# Patient Record
Sex: FEMALE | Race: WHITE | NOT HISPANIC OR LATINO | Employment: FULL TIME
[De-identification: names, ages, dates, MRNs, and addresses within clinical notes are randomized per-mention and may not be internally consistent; named-entity substitution may affect disease eponyms.]

---

## 2017-06-24 ENCOUNTER — HEALTH MAINTENANCE LETTER (OUTPATIENT)
Age: 42
End: 2017-06-24

## 2017-08-11 ENCOUNTER — HOSPITAL ENCOUNTER (EMERGENCY)
Facility: CLINIC | Age: 42
Discharge: HOME | End: 2017-08-11
Attending: EMERGENCY MEDICINE | Admitting: EMERGENCY MEDICINE
Payer: COMMERCIAL

## 2017-08-11 VITALS
RESPIRATION RATE: 16 BRPM | BODY MASS INDEX: 24.91 KG/M2 | OXYGEN SATURATION: 98 % | SYSTOLIC BLOOD PRESSURE: 135 MMHG | DIASTOLIC BLOOD PRESSURE: 89 MMHG | TEMPERATURE: 99.4 F | WEIGHT: 155 LBS | HEIGHT: 66 IN

## 2017-08-11 DIAGNOSIS — K08.89 PAIN, DENTAL: ICD-10-CM

## 2017-08-11 DIAGNOSIS — G89.18 POSTOPERATIVE PAIN: ICD-10-CM

## 2017-08-11 PROCEDURE — 99283 EMERGENCY DEPT VISIT LOW MDM: CPT

## 2017-08-11 PROCEDURE — 99283 EMERGENCY DEPT VISIT LOW MDM: CPT | Mod: Z6 | Performed by: EMERGENCY MEDICINE

## 2017-08-11 RX ORDER — OXYCODONE HYDROCHLORIDE 5 MG/1
5 TABLET ORAL EVERY 6 HOURS PRN
Qty: 8 TABLET | Refills: 0 | Status: SHIPPED | OUTPATIENT
Start: 2017-08-11 | End: 2024-01-26

## 2017-08-11 ASSESSMENT — ENCOUNTER SYMPTOMS: LIGHT-HEADEDNESS: 1

## 2017-08-11 NOTE — ED AVS SNAPSHOT
[facility], Emergency Department    [address] [address]    Phone:  [phone]                                       Jyothi Corrales   MRN: [id number]    Department:  [facility], Emergency Department   Date of Visit:  8/11/2017           Patient Information     Date Of Birth          1975        Your diagnoses for this visit were:     Pain, dental        You were seen by Reuben Walker MD.        Discharge Instructions       Please make an appointment to follow up with your dentist as soon as possible.      24 Hour Appointment Hotline       To make an appointment at any [facility], call [phone] ([phone]). If you don't have a family doctor or clinic, we will help you find one. [facility] clinics are conveniently located to serve the needs of you and your family.             Review of your medicines      START taking        Dose / Directions Last dose taken    oxyCODONE 5 MG IR tablet   Commonly known as:  ROXICODONE   Dose:  5 mg   Quantity:  8 tablet        Take 1 tablet (5 mg) by mouth every 6 hours as needed for pain   Refills:  0                Prescriptions were sent or printed at these locations (1 Prescription)                   Other Prescriptions                Printed at Department/Unit printer (1 of 1)         oxyCODONE (ROXICODONE) 5 MG IR tablet                Orders Needing Specimen Collection     None      Pending Results     No orders found from 8/9/2017 to 8/12/2017.            Pending Culture Results     No orders found from 8/9/2017 to 8/12/2017.            Pending Results Instructions     If you had any lab results that were not finalized at the time of your Discharge, you can call the ED Lab Result RN at [phone]. You will be contacted by this team for any positive Lab results or changes in treatment. The nurses are available 7 days a week from 10A to 6:30P.  You can leave a message 24 hours per day and they will return your call.       "  Thank you for choosing [facility]       Thank you for choosing [facility] for your care. Our goal is always to provide you with excellent care. Hearing back from our patients is one way we can continue to improve our services. Please take a few minutes to complete the written survey that you may receive in the mail after you visit with us. Thank you!        [employer] Mobile Information     [email or web address] lets you send messages to your doctor, view your test results, renew your prescriptions, schedule appointments and more. To sign up, go to [email or web address] . Click on \"Log in\" on the left side of the screen, which will take you to the Welcome page. Then click on \"Sign up Now\" on the right side of the page.     You will be asked to enter the access code listed below, as well as some personal information. Please follow the directions to create your username and password.     Your access code is: [email or web address]  Expires: 2017  4:45 PM     Your access code will  in 90 days. If you need help or a new code, please call your [facility] clinic or [phone].        Care EveryWhere ID     This is your Care EveryWhere ID. This could be used by other organizations to access your [facility] medical records  [id number]        Equal Access to Services     MEHRDAD AMAYA : Cali higuerao Solorena, waaxda luqadaha, qaybta kaalmada adearisyada, mary young. So [facility] [phone].    ATENCIÓN: Si habla español, tiene a drummond disposición servicios gratuitos de asistencia lingüística. Llame al [phone].    We comply with applicable federal civil rights laws and [place] laws. We do not discriminate on the basis of race, color, national origin, age, disability sex, sexual orientation or gender identity.            After Visit Summary       This is your record. Keep this with you and show to your community pharmacist(s) and doctor(s) at your next visit.                  "

## 2017-08-11 NOTE — ED AVS SNAPSHOT
[facility], [place], Emergency Department    [address] [place] [address]    Phone:  [phone]                                       Jyothi Corrales   MRN: [id number]    Department:  [facility], Emergency Department   Date of Visit:  8/11/2017           After Visit Summary Signature Page     I have received my discharge instructions, and my questions have been answered. I have discussed any challenges I see with this plan with the nurse or doctor.    ..........................................................................................................................................  Patient/Patient Representative Signature      ..........................................................................................................................................  Patient Representative Print Name and Relationship to Patient    ..................................................               ................................................  Date                                            Time    ..........................................................................................................................................  Reviewed by Signature/Title    ...................................................              ..............................................  Date                                                            Time

## 2017-08-11 NOTE — ED NOTES
Alert orientated ambulatory patient presents to ER triage with c/o:    1.) Dental Pain    Hx:  Root canal Tuesday.  Patient states pain is now in jaw and feels lightheaded.  Patient contacted Dentist and he recommended tylenol/ibu.  Patient has been following that plan, however pain and slight temp has cause her concern for infection.  Temp 99.4 upon arrival today - last dose 13:30 800 mg IBU, naproxen @ 10:30      Airway WDLs  Breathing WDLs  Circulation WDLs

## 2017-08-11 NOTE — ED PROVIDER NOTES
"  History     Chief Complaint   Patient presents with     Dental Pain     HPI  Jyothi Corrales is a 42 year old female who presents with dental pain. The patient reports that she had a root canal on Tuesday, 3 days ago, and since then she has continued to have jaw pain and fever. She notes that she has been taking Tylenol, ibuprofen and naproxen for her pain, however, she continues to have pain. She also developed lightheadedness today, to the point that she was unable to drive, prompting her arrival to the ED. She initially took ibuprofen and Tylenol at 8:30 AM today and naproxen at 10:30 AM. Her last dose of medication was ibuprofen 800 mg at 1:30 PM today. She complains of right-sided throbbing jaw pain and lightheadedness.     No past medical history on file.    No past surgical history on file.    No family history on file.    Social History   Substance Use Topics     Smoking status: Not on file     Smokeless tobacco: Not on file     Alcohol use Not on file     No current facility-administered medications for this encounter.      Current Outpatient Prescriptions   Medication     oxyCODONE (ROXICODONE) 5 MG IR tablet      No Known Allergies     I have reviewed the Medications, Allergies, Past Medical and Surgical History, and Social History in the Epic system.    Review of Systems   HENT: Positive for dental problem (right-sided).    Neurological: Positive for light-headedness.   All other systems reviewed and are negative.      Physical Exam   BP: 135/89  Heart Rate: 86  Temp: 99.4  F (37.4  C)  Resp: 16  Height: 166.4 cm (5' 5.5\")  Weight: 70.3 kg (155 lb)  SpO2: 98 %  Physical Exam   Constitutional: She is oriented to person, place, and time. She appears well-developed and well-nourished. No distress.   HENT:   Head: Normocephalic and atraumatic.   No obvious signs of dental abscess or dental caries   Eyes: No scleral icterus.   Neck: Normal range of motion. Neck supple.   Cardiovascular: Normal rate.  "   Pulmonary/Chest: Effort normal.   Neurological: She is alert and oriented to person, place, and time.   Skin: Skin is warm and dry. No rash noted. She is not diaphoretic. No erythema. No pallor.       ED Course     ED Course     Procedures     4:16 PM  The patient was seen and examined by Dr. Walker in Room [facility].               Critical Care time:  none               Labs Ordered and Resulted from Time of ED Arrival Up to the Time of Departure from the ED - No data to display         Assessments & Plan (with Medical Decision Making)   This is a 42-year-old female patient coming in with complaints of dental pain after a root canal that she had a couple days ago.  She is concerned because her child continues to hurt.  She contacted her dentist who prescribed antibiotics.  At this time I do not see any signs of infection.  I will provide her with a short course of Roxicodone and she can follow up with her dentist for further care and management.    I have reviewed the nursing notes.    I have reviewed the findings, diagnosis, plan and need for follow up with the patient.    Discharge Medication List as of 8/11/2017  4:45 PM      START taking these medications    Details   oxyCODONE (ROXICODONE) 5 MG IR tablet Take 1 tablet (5 mg) by mouth every 6 hours as needed for pain, Disp-8 tablet, R-0, Local Print             Final diagnoses:   Pain, dental     ISusan, am serving as a trained medical scribe to document services personally performed by Reuben Walker MD, based on the provider's statements to me.   Reuben FRANKS MD, was physically present and have reviewed and verified the accuracy of this note documented by Susan Baltazar.   8/11/2017   [facility], [place], EMERGENCY DEPARTMENT     Reuben Walker MD  08/12/17 8691

## 2024-01-14 ENCOUNTER — HEALTH MAINTENANCE LETTER (OUTPATIENT)
Age: 49
End: 2024-01-14

## 2024-01-26 ENCOUNTER — OFFICE VISIT (OUTPATIENT)
Dept: OBGYN | Facility: CLINIC | Age: 49
End: 2024-01-26
Payer: COMMERCIAL

## 2024-01-26 VITALS
WEIGHT: 183 LBS | BODY MASS INDEX: 29.99 KG/M2 | SYSTOLIC BLOOD PRESSURE: 130 MMHG | RESPIRATION RATE: 16 BRPM | DIASTOLIC BLOOD PRESSURE: 78 MMHG | TEMPERATURE: 97.8 F | HEART RATE: 90 BPM

## 2024-01-26 DIAGNOSIS — K57.30 DIVERTICULAR DISEASE OF COLON: ICD-10-CM

## 2024-01-26 DIAGNOSIS — Z12.31 ENCOUNTER FOR SCREENING MAMMOGRAM FOR BREAST CANCER: ICD-10-CM

## 2024-01-26 DIAGNOSIS — Z12.11 ENCOUNTER FOR SCREENING COLONOSCOPY: ICD-10-CM

## 2024-01-26 DIAGNOSIS — Z01.419 WOMEN'S ANNUAL ROUTINE GYNECOLOGICAL EXAMINATION: Primary | ICD-10-CM

## 2024-01-26 PROBLEM — J45.40 MODERATE PERSISTENT ASTHMA WITHOUT COMPLICATION: Status: ACTIVE | Noted: 2017-03-08

## 2024-01-26 PROBLEM — K57.32 DIVERTICULITIS OF COLON: Status: ACTIVE | Noted: 2023-11-17

## 2024-01-26 LAB
ALBUMIN SERPL BCG-MCNC: 4.1 G/DL (ref 3.5–5.2)
ALP SERPL-CCNC: 76 U/L (ref 40–150)
ALT SERPL W P-5'-P-CCNC: 18 U/L (ref 0–50)
ANION GAP SERPL CALCULATED.3IONS-SCNC: 9 MMOL/L (ref 7–15)
AST SERPL W P-5'-P-CCNC: 22 U/L (ref 0–45)
BILIRUB SERPL-MCNC: 0.3 MG/DL
BUN SERPL-MCNC: 9.1 MG/DL (ref 6–20)
CALCIUM SERPL-MCNC: 9.4 MG/DL (ref 8.6–10)
CHLORIDE SERPL-SCNC: 107 MMOL/L (ref 98–107)
CHOLEST SERPL-MCNC: 242 MG/DL
CREAT SERPL-MCNC: 0.66 MG/DL (ref 0.51–0.95)
DEPRECATED HCO3 PLAS-SCNC: 24 MMOL/L (ref 22–29)
EGFRCR SERPLBLD CKD-EPI 2021: >90 ML/MIN/1.73M2
ERYTHROCYTE [DISTWIDTH] IN BLOOD BY AUTOMATED COUNT: 12.5 % (ref 10–15)
FASTING STATUS PATIENT QL REPORTED: NO
GLUCOSE SERPL-MCNC: 91 MG/DL (ref 70–99)
HCT VFR BLD AUTO: 43.5 % (ref 35–47)
HDLC SERPL-MCNC: 67 MG/DL
HGB BLD-MCNC: 14.2 G/DL (ref 11.7–15.7)
LDLC SERPL CALC-MCNC: 143 MG/DL
MCH RBC QN AUTO: 30.1 PG (ref 26.5–33)
MCHC RBC AUTO-ENTMCNC: 32.6 G/DL (ref 31.5–36.5)
MCV RBC AUTO: 92 FL (ref 78–100)
NONHDLC SERPL-MCNC: 175 MG/DL
PLATELET # BLD AUTO: 304 10E3/UL (ref 150–450)
POTASSIUM SERPL-SCNC: 4.3 MMOL/L (ref 3.4–5.3)
PROT SERPL-MCNC: 7.2 G/DL (ref 6.4–8.3)
RBC # BLD AUTO: 4.71 10E6/UL (ref 3.8–5.2)
SODIUM SERPL-SCNC: 140 MMOL/L (ref 135–145)
TRIGL SERPL-MCNC: 160 MG/DL
TSH SERPL DL<=0.005 MIU/L-ACNC: 1.2 UIU/ML (ref 0.3–4.2)
WBC # BLD AUTO: 8.2 10E3/UL (ref 4–11)

## 2024-01-26 PROCEDURE — 99386 PREV VISIT NEW AGE 40-64: CPT | Performed by: OBSTETRICS & GYNECOLOGY

## 2024-01-26 PROCEDURE — 85027 COMPLETE CBC AUTOMATED: CPT | Performed by: OBSTETRICS & GYNECOLOGY

## 2024-01-26 PROCEDURE — G0145 SCR C/V CYTO,THINLAYER,RESCR: HCPCS | Performed by: OBSTETRICS & GYNECOLOGY

## 2024-01-26 PROCEDURE — 80061 LIPID PANEL: CPT | Performed by: OBSTETRICS & GYNECOLOGY

## 2024-01-26 PROCEDURE — 87624 HPV HI-RISK TYP POOLED RSLT: CPT | Performed by: OBSTETRICS & GYNECOLOGY

## 2024-01-26 PROCEDURE — 36415 COLL VENOUS BLD VENIPUNCTURE: CPT | Performed by: OBSTETRICS & GYNECOLOGY

## 2024-01-26 PROCEDURE — 84443 ASSAY THYROID STIM HORMONE: CPT | Performed by: OBSTETRICS & GYNECOLOGY

## 2024-01-26 PROCEDURE — 80053 COMPREHEN METABOLIC PANEL: CPT | Performed by: OBSTETRICS & GYNECOLOGY

## 2024-01-26 RX ORDER — FLUTICASONE PROPIONATE AND SALMETEROL 100; 50 UG/1; UG/1
1 POWDER RESPIRATORY (INHALATION)
COMMUNITY
Start: 2022-02-15

## 2024-01-26 RX ORDER — CETIRIZINE HYDROCHLORIDE 10 MG/1
10 TABLET ORAL
COMMUNITY

## 2024-01-26 RX ORDER — BUSPIRONE HYDROCHLORIDE 10 MG/1
10 TABLET ORAL DAILY
COMMUNITY
Start: 2022-09-22 | End: 2024-05-02

## 2024-01-26 RX ORDER — ALBUTEROL SULFATE 90 UG/1
AEROSOL, METERED RESPIRATORY (INHALATION)
COMMUNITY
Start: 2005-01-01

## 2024-01-26 ASSESSMENT — ENCOUNTER SYMPTOMS
CHILLS: 0
ABDOMINAL PAIN: 0
WEAKNESS: 0
PALPITATIONS: 0
ARTHRALGIAS: 0
BREAST MASS: 0
PARESTHESIAS: 0
SHORTNESS OF BREATH: 0
DIARRHEA: 0
HEMATURIA: 0
SORE THROAT: 0
NERVOUS/ANXIOUS: 0
HEARTBURN: 0
DYSURIA: 0
EYE PAIN: 0
CONSTIPATION: 0
DIZZINESS: 0
COUGH: 0
JOINT SWELLING: 0
FEVER: 0
NAUSEA: 0
HEMATOCHEZIA: 0
FREQUENCY: 0
MYALGIAS: 0
HEADACHES: 0

## 2024-01-26 ASSESSMENT — PATIENT HEALTH QUESTIONNAIRE - PHQ9
SUM OF ALL RESPONSES TO PHQ QUESTIONS 1-9: 0
5. POOR APPETITE OR OVEREATING: NOT AT ALL

## 2024-01-26 ASSESSMENT — ANXIETY QUESTIONNAIRES
3. WORRYING TOO MUCH ABOUT DIFFERENT THINGS: NOT AT ALL
2. NOT BEING ABLE TO STOP OR CONTROL WORRYING: NOT AT ALL
1. FEELING NERVOUS, ANXIOUS, OR ON EDGE: NOT AT ALL
GAD7 TOTAL SCORE: 0
7. FEELING AFRAID AS IF SOMETHING AWFUL MIGHT HAPPEN: NOT AT ALL
5. BEING SO RESTLESS THAT IT IS HARD TO SIT STILL: NOT AT ALL
6. BECOMING EASILY ANNOYED OR IRRITABLE: NOT AT ALL
IF YOU CHECKED OFF ANY PROBLEMS ON THIS QUESTIONNAIRE, HOW DIFFICULT HAVE THESE PROBLEMS MADE IT FOR YOU TO DO YOUR WORK, TAKE CARE OF THINGS AT HOME, OR GET ALONG WITH OTHER PEOPLE: NOT DIFFICULT AT ALL
GAD7 TOTAL SCORE: 0

## 2024-01-26 NOTE — PROGRESS NOTES
"Jyothi is a 49 year old No obstetric history on file. female who presents for annual exam.     Menses are regular q 28-30 days and normal lasting 5 days.  Menses flow: heavy.  Patient's last menstrual period was 01/26/2024 (exact date).. Using none for contraception.  She is not currently considering pregnancy. She is having some perimenopause symptoms (hot flashes, sleep disturbances). She uses low dose THC gummies to help her sleep and takes black cohosh for hot flashes which have improved.   Besides routine health maintenance, she has no other health concerns today .  GYNECOLOGIC HISTORY:  Menarche: 13  Age at first intercourse: 18   Number of lifetime partners: 15  Jyothi is sexually active with male partner(s) and is currently in monogamous relationship with .    History sexually transmitted infections:No STD history  STI testing offered?  Declined  JULES exposure: Unknown  History of abnormal Pap smear: NO - age 30-65 PAP every 5 years with negative HPV co-testing recommended  Family history of breast CA: Yes (Please explain): Maternal Grandmother, Maternal Aunt  Family history of uterine/ovarian CA: No    Family history of colon CA: No    HEALTH MAINTENANCE:  Cholesterol: (No results found for: \"CHOL\" History of abnormal lipids: Yes  Mammo: Yes . History of abnormal Mammo: YES.  Regular Self Breast Exams: Yes  Calcium/Vitamin D intake: source:  none Adequate? No  TSH: (No results found for: \"TSH\" )  Pap; (No results found for: \"PAP\" )    HISTORY:  OB History   No obstetric history on file.     No past medical history on file.  No past surgical history on file.  No family history on file.  Social History     Socioeconomic History    Marital status:        Current Outpatient Medications:     oxyCODONE (ROXICODONE) 5 MG IR tablet, Take 1 tablet (5 mg) by mouth every 6 hours as needed for pain, Disp: 8 tablet, Rfl: 0   No Known Allergies    Past medical, surgical, social and family history were " reviewed and updated in EPIC.    ROS:   C:     NEGATIVE for fever, chills, change in weight  I:       NEGATIVE for worrisome rashes, moles or lesions  E:     NEGATIVE for vision changes or irritation  E/M: NEGATIVE for ear, mouth and throat problems  R:     NEGATIVE for significant cough or SOB  CV:   NEGATIVE for chest pain, palpitations or peripheral edema  GI:     NEGATIVE for nausea, abdominal pain, heartburn, or change in bowel habits  :   NEGATIVE for frequency, dysuria, hematuria, vaginal discharge, or irregular bleeding  M:     NEGATIVE for significant arthralgias or myalgia  N:      NEGATIVE for weakness, dizziness or paresthesias  E:      NEGATIVE for temperature intolerance, skin/hair changes  P:      NEGATIVE for changes in mood or affect.    EXAM:  /78 (BP Location: Right arm, Patient Position: Sitting, Cuff Size: Adult Regular)   Pulse 90   Temp 97.8  F (36.6  C)   Resp 16   Wt 83 kg (183 lb)   LMP 01/26/2024 (Exact Date)   BMI 29.99 kg/m     BMI: Body mass index is 29.99 kg/m .  Constitutional: healthy, alert and no distress  Head: Normocephalic. No masses, lesions, tenderness or abnormalities  Neck: Neck supple. Trachea midline. No adenopathy. Thyroid symmetric, normal size.   Cardiovascular: RRR.   Respiratory: Negative.   Breast: No nodularity, asymmetry or nipple discharge bilaterally.  Gastrointestinal: Abdomen soft, non-tender, non-distended. No masses, organomegaly.  :  Vulva:  No external lesions, normal female hair distribution, no inguinal adenopathy.    Urethra:  Midline, non-tender, well supported, no discharge  Vagina:  Moist, pink, no abnormal discharge, no lesions  Uterus:  Normal size , non-tender, freely mobile  Ovaries:  No masses appreciated, non-tender, mobile  Rectal Exam: deferred  Musculoskeletal: extremities normal  Skin: no suspicious lesions or rashes  Psychiatric: Affect appropriate, cooperative,mentation appears normal.     COUNSELING:   Reviewed preventive  health counseling, as reflected in patient instructions       Colorectal Cancer Screening       (Ruth)menopause management   reports that she has never smoked. She has never been exposed to tobacco smoke. She has never used smokeless tobacco.    Body mass index is 29.99 kg/m .  Weight management plan: Discussed healthy diet and exercise guidelines  FRAX Risk Assessment    ASSESSMENT:  49 year old female with satisfactory annual exam   (Z01.419) Women's annual routine gynecological examination  (primary encounter diagnosis)  Plan: Lipid Profile, CBC with platelets,         Comprehensive metabolic panel, TSH with free T4        reflex, Pap screen with HPV - recommended age         30 - 65 years      (Z12.31) Encounter for screening mammogram for breast cancer  Plan: *MA Screening Digital Bilateral        (K57.30) Diverticular disease of colon  Plan: Adult GI  Referral - Consult Only      (Z12.11) Encounter for screening colonoscopy  Plan: Colonoscopy Screening  Referral    Dispo: RTC in one year or sooner THERESE Aguillon MD

## 2024-01-29 DIAGNOSIS — Z01.419 WOMEN'S ANNUAL ROUTINE GYNECOLOGICAL EXAMINATION: Primary | ICD-10-CM

## 2024-01-29 NOTE — TELEPHONE ENCOUNTER
REFERRAL INFORMATION:  Referring Provider:  Gretchen Aguillon MD   Referring Clinic:  [facility]   Reason for Visit/Diagnosis: Diverticular disease of colon      FUTURE VISIT INFORMATION:  Appointment Date: 2/13/2024  Appointment Time:      NOTES STATUS DETAILS   OFFICE NOTE from Referring Provider Internal 1/26/2024 OV with OB/Gyn LACEY Aguillon   OFFICE NOTE from Other Specialist Care Everywhere [facility]:   11/14/2023 UC visit with COURTNEY Ross   10/13/2022 UC visit with COURTNEY Ross   HOSPITAL DISCHARGE SUMMARY/  ED VISITS N/A    OPERATIVE REPORT N/A    MEDICATION LIST Internal         ENDOSCOPY  N/A    COLONOSCOPY N/A    IMAGING (CT, MRI, EGD, MRCP, Small Bowel Follow Through/SBT, MR/CT Enterography) Care Everywhere [facility]:   10/13/2022 CT ABD PEL

## 2024-01-31 LAB
BKR LAB AP GYN ADEQUACY: NORMAL
BKR LAB AP GYN INTERPRETATION: NORMAL
BKR LAB AP HPV REFLEX: NORMAL
BKR LAB AP PREVIOUS ABNORMAL: NORMAL
PATH REPORT.COMMENTS IMP SPEC: NORMAL
PATH REPORT.COMMENTS IMP SPEC: NORMAL
PATH REPORT.RELEVANT HX SPEC: NORMAL

## 2024-02-02 LAB
HUMAN PAPILLOMA VIRUS 16 DNA: NEGATIVE
HUMAN PAPILLOMA VIRUS 18 DNA: NEGATIVE
HUMAN PAPILLOMA VIRUS FINAL DIAGNOSIS: NORMAL
HUMAN PAPILLOMA VIRUS OTHER HR: NEGATIVE

## 2024-02-06 NOTE — PROGRESS NOTES
GASTROENTEROLOGY NEW PATIENT VIRTUAL VISIT      How would you like to obtain your AVS? MyChart  If the video visit is dropped, the invitation should be resent by: Text to cell phone: [phone]  Will anyone else be joining your video visit? No    Video-Visit Details     Type of service:  Video Visit     Video Start Time (time video started): 1258     Video End Time (time video stopped): 1325     Physician has received verbal consent for a Video Visit from the patient? Yes     Originating Location (pt. Location): Home    Distant Location (provider location):  Off-site    Platform used for Video Visit: [facility]      GASTROENTEROLOGY NEW PATIENT VIDEO VISIT    CC/REFERRING MD:    No Ref-Primary, Physician  Gretchen Aguillon  REASON FOR CONSULTATION:   Referred for Consult (Diverticular disease of colon)      HISTORY OF PRESENT ILLNESS:  Jyothi Corrales is 49 year old female who presents for a consultation on prevention of diverticulitis and management of diverticulosis .   Had first episode of diverticulitis in 2022 and another one 2 months ago with symptoms of poor appetite, bloating, and ome black stools. CT scan was suggestive for uncomplicated sigmoid diverticulitis. Was treated with cipro and flagyl with resolution of symptoms. Was on soft bland diet.  Today, the patient stated that she is feeling well except her appetite is still poor. Denies abdominal pain, nausea, vomiting, hematochezia, or melena.   Denies hx of bowel surgery.     PREVIOUS ENDOSCOPY:  None  RADIOLOGY:   11/20/23 CT scan of abdomen and pelvis  FINDINGS:   Lower chest: The visualized lower lungs are aerated. No pleural or pericardial effusion.   ABDOMEN:   Liver: Normal attenuation.   Gallbladder and biliary: Normal gallbladder without radiopaque stone. Normal caliber bile ducts.   Spleen: Normal size and attenuation.   Pancreas: The noncontrast pancreas is homogeneous in attenuation without peripancreatic inflammatory changes or ductal  dilatation.   Adrenal glands: Normal adrenal glands.   Kidneys and ureters: Normal attenuation. No radio-opaque calculi. No hydroureteronephrosis.   GI tract: The stomach is relatively decompressed. Normal caliber small and large bowel loops. Normal appendix. Colonic diverticulosis with adjacent inflammatory stranding at the proximal sigmoid. Small duodenal diverticulum.   Vascular structures: Normal caliber abdominal aorta.   Lymph nodes: No lymphadenopathy in the abdomen or pelvis by size criteria.  Peritoneum: No free air, free fluid, or focal drainable fluid collection.   PELVIS:   Genitourinary system: Normal urinary bladder. Age-appropriate uterus and ovaries.     SKELETAL STRUCTURES AND SOFT TISSUES: Grade 1 anterolisthesis of L4 on L5 secondary to bilateral L4 pars defects. Mild lumbar spondylosis at this level.       HISTORY:   has a past medical history of Anxiety, Asthma, Depression, Pneumonia, and Trauma.     has a past surgical history that includes Facelift (12/12/2023); Abdominoplasty (10/20/2021); and Mammoplasty reduction (05/28/2021).     reports that she quit smoking about 23 years ago. Her smoking use included cigarettes. She has never been exposed to tobacco smoke. She has never used smokeless tobacco. She reports that she does not currently use alcohol. She reports that she does not use drugs.    family history is not on file.    ALLERGIES:   No Known Allergies    PERTINENT MEDICATIONS:    Current Outpatient Medications:     albuterol (VENTOLIN HFA) 108 (90 Base) MCG/ACT inhaler, , Disp: , Rfl:     Black Cohosh-SoyIsoflav-C Quad 40- MG CAPS, , Disp: , Rfl:     busPIRone (BUSPAR) 10 MG tablet, Take 10 mg by mouth daily, Disp: , Rfl:     cetirizine (ZYRTEC) 10 MG tablet, Take 10 mg by mouth, Disp: , Rfl:     fluticasone-salmeterol (ADVAIR) 100-50 MCG/ACT inhaler, Inhale 1 puff into the lungs, Disp: , Rfl:       ROS: 10pt ROS performed and otherwise negative.    PHYSICAL EXAMINATION:  Wt:    Wt Readings from Last 2 Encounters:   01/26/24 83 kg (183 lb)   08/11/17 70.3 kg (155 lb)      Physical Exam  Vitals reviewed: LMP 01/26/2024 (Exact Date)    Constitutional: aaox3, cooperative, pleasant, not dyspneic/diaphoretic, no acute distress  Eyes: Sclera anicteric/injected  Respiratory: Unlabored breathing, speaking in full sentences.   Psych: Normal affect, speech is clear and appropriate.Neatly groomed    RECENT LABS:   Recent Labs   Lab Test 01/26/24  1045   WBC 8.2   HGB 14.2   HCT 43.5        Recent Labs   Lab Test 01/26/24  1045   ALT 18   AST 22     Recent Labs   Lab Test 01/26/24  1045   CR 0.66     TSH   Date Value Ref Range Status   01/26/2024 1.20 0.30 - 4.20 uIU/mL Final         ASSESSMENT/PLAN:    ICD-10-CM    1. Duodenal diverticulum  K57.10       2. Diverticular disease of colon  K57.30 Adult GI  Referral - Consult Only     Adult GI  Referral - Procedure Only     Adult GI Clinic Follow-Up Order (Blank)         Jyothi Corrales is a 49 year old female who presents for a consultation on diverticular disease of the colon. Had 2 episodes of acute uncomplicated diverticulitis in the past 14 months. Treated with antibiotics that brought complete resolution of the symptoms.  We discussed etiology, management, and prevention of acute diverticulitis.  I explained that majority of people with diverticulosis are not aware of its presence and have no symptoms.  But approximately 15 to 25 percent of people may develop diverticulitis, which is inflammation of diverticulum.  This may be caused by increased pressure within the colon or by hardened particles of stool, which can become lodged within the diverticulum. The symptoms of diverticulitis depend upon the degree of inflammation present. The most common symptom is pain in the left lower abdomen. Other symptoms can include nausea and vomiting, constipation, diarrhea, and urinary symptoms such as pain or burning when urinating or  the frequent need to urinate.   We no longer prescribe antibiotics for every occurrence of diverticulitis nor do we suggest bowel resection surgery for management of uncomplicated case of diverticulosis.Most of these cases could be treated at home. This usually involves a liquid diet and pain-relieving medication. However, if patients develop one or more of the following signs or symptoms, they should seek immediate medical attention:  -Temperature >100.1 F (38 C)  - Worsening or severe abdominal pain  - Inability to tolerate fluids.    I recommended to proceed with colonoscopy as the patient never had one.  Hold initiation of a fiber supplement until after the colonoscopy.  Monitor symptoms of acute diverticulitis and timely seek medical attention.  Manage and prevent constipation. Take 1/2 of cap of Miralax every day if notices hard or infrequent stools.  Patient verbalized understanding and appreciation of care provided. Stated that all of the questions were answered to her/his satisfaction.  Follow up in 3 months  This note was created with Dragon voice recognition software. Inadvertent minor typographic errors may occur in transcription. Feel free to contact the provider if you have any questions.  I sincerely appreciate an opportunity to provide consultation for this pleasant patient.    ADMA Fowler  [facility],  Gastroenterology,  [place], [place]

## 2024-02-13 ENCOUNTER — PRE VISIT (OUTPATIENT)
Dept: GASTROENTEROLOGY | Facility: CLINIC | Age: 49
End: 2024-02-13

## 2024-02-13 ENCOUNTER — VIRTUAL VISIT (OUTPATIENT)
Dept: GASTROENTEROLOGY | Facility: CLINIC | Age: 49
End: 2024-02-13
Attending: OBSTETRICS & GYNECOLOGY
Payer: COMMERCIAL

## 2024-02-13 DIAGNOSIS — K57.10 DUODENAL DIVERTICULUM: Primary | ICD-10-CM

## 2024-02-13 DIAGNOSIS — K57.30 DIVERTICULAR DISEASE OF COLON: ICD-10-CM

## 2024-02-13 PROCEDURE — 99204 OFFICE O/P NEW MOD 45 MIN: CPT | Mod: 95 | Performed by: NURSE PRACTITIONER

## 2024-02-13 NOTE — PATIENT INSTRUCTIONS
It was a pleasure taking care of you today.  I've included a brief summary of our discussion and care plan from today's visit below.  Please review this information with your primary care provider.  ______________________________________________________________________    My recommendations are summarized as follows:    As we discussed today, colonoscopy was ordered. You will be contacted by our endoscopy department to schedule the study. Please plan to have a day off work and a reliable  to give you a ride back after the colonoscopy.    2.  Plan to start a high fiber diet or a fiber supplement after colonoscopy if there are no findings of acute inflammation or any other pathology.    3. Seek medical attention if you develop abdominal pain with associated black or bloody stools, nausea, vomiting, fever, loss of appetite, and abdominal bloating.    Return to GI Clinic in 3 months to review your progress.    ______________________________________________________________________    DIVERTICULOSIS  A diverticulum is a pouch-like structure that can form through points of weakness in the muscular wall of the colon (ie, at points where blood vessels pass through the wall).   Diverticulosis merely describes the presence of diverticula. Diverticulosis is often found during a test done for other reasons, such as flexible sigmoidoscopy, colonoscopy, or barium enema. Most people with diverticulosis have no symptoms and will remain symptom free for the rest of their lives.     People with diverticulosis who do not have symptoms do not require treatment. However, most clinicians recommend increasing fiber in the diet, which can help to bulk the stools and possibly prevent the development of new diverticula, diverticulitis, or diverticular bleeding. Fiber is not proven to prevent these conditions in all patients but may help to control recurrent episodes in some.   Fruits and vegetables are a good source of fiber. Patients  with diverticular disease have historically been advised to avoid whole pieces of fiber (such as seeds, corn, and nuts) because of concern that these foods could cause an episode of diverticulitis. However, this belief is completely unproven. We do not suggest that patients with diverticulosis avoid seeds, corn, or nuts.     Approximately 15 to 25 percent of people with diverticulosis may develop diverticulitis, which is inflammation of diverticulum.  This may be caused by increased pressure within the colon or by hardened particles of stool, which can become lodged within the diverticulum. The symptoms of diverticulitis depend upon the degree of inflammation present. The most common symptom is pain in the left lower abdomen. Other symptoms can include nausea and vomiting, constipation, diarrhea, and urinary symptoms such as pain or burning when urinating or the frequent need to urinate.   If your disease is mild and you are otherwise healthy, you might be treated at home. This usually involves a liquid diet and pain-relieving medication. However, if you develop one or more of the following signs or symptoms, you should seek immediate medical attention:  ?Temperature >100.1 F (38 C)  ?Worsening or severe abdominal pain  ?Inability to tolerate fluids            ______________________________________________________________________    Who do I call with any questions after my visit?  Please be in touch if there are any further questions that arise following today's visit.  There are multiple ways to contact your gastroenterology care team.      During business hours, you may reach a Gastroenterology nurse at [phone], option 3.     To schedule or reschedule an appointment, please call [phone].   To schedule your imaging studies (CT, MRI, ultrasound)  call [phone] (or toll-free # [phone])  To schedule your lab work at [facility], please call [phone]    You can always  send a secure message through Ymagis.  Ymagis messages are answered by your nurse or doctor typically within 24 hours.  Please allow extra time on weekends and holidays.      For urgent/emergent questions after business hours, you may reach the on-call GI Fellow by contacting the [facility]  at [phone].    In order for your refill to be processed in a timely fashion, it is your responsibility to ensure you follow the recommendations from your provider regarding your laboratory studies and follow up appointments.       How will I get the results of any tests ordered?    You will receive all of your results.  If you have signed up for [employer], any tests ordered at your visit will be available to you after your physician reviews them.  Typically this takes 1-2 weeks.  If there are urgent results that require a change in your care plan, your physician or nurse will call you to discuss the next steps.   What is Ymagis?  Ymagis is a secure way for you to access all of your healthcare records from the [facility].  It is a web based computer program, so you can sign on to it from any location.  It also allows you to send secure messages to your care team.  I recommend signing up for Ymagis access if you have not already done so and are comfortable with using a computer.    How to I schedule a follow-up visit?  If you did not schedule a follow-up visit today, please call [phone] to schedule a follow-up office visit.      Sincerely,  ADAM Fowler,  [facility],  Division of Gastroenterology   ([facility])

## 2024-02-19 ENCOUNTER — TELEPHONE (OUTPATIENT)
Dept: GASTROENTEROLOGY | Facility: CLINIC | Age: 49
End: 2024-02-19
Payer: COMMERCIAL

## 2024-02-19 ENCOUNTER — TELEPHONE (OUTPATIENT)
Dept: OBGYN | Facility: CLINIC | Age: 49
End: 2024-02-19
Payer: COMMERCIAL

## 2024-02-19 NOTE — TELEPHONE ENCOUNTER
[facility] Call Center    Phone Message    May a detailed message be left on voicemail: yes     Reason for Call: Other: Pt is looking to have her mammogram referral sent to [facility]. Pt states her mammo is already scheduled with [facility] but would like the referral faxed as well. Fax number is [phone]. Please call pt with any questions or concerns.        Action Taken: Other: OBGYN    Travel Screening: Not Applicable

## 2024-02-19 NOTE — TELEPHONE ENCOUNTER
"Endoscopy Scheduling Screen    Have you had a positive Covid test in the last 14 days?  No    Are you active on MyChart?   Yes    What [place] is in the chart?  [place]  [facility] [place]ing/Referring Provider: Ava Jesus APRN CNP     (If ordering provider performs procedure, schedule with ordering provider unless otherwise instructed. )    BMI: Estimated body mass index is 29.99 kg/m  as calculated from the following:    Height as of 8/11/17: 1.664 m (5' 5.5\").    Weight as of 1/26/24: 83 kg (183 lb).     Sedation Ordered  moderate sedation.   If patient BMI > 50 do not schedule in [facility].    If patient BMI > 45 do not schedule at [facility].    Are you taking methadone or Suboxone?  No    Have you had difficulties, pain, or discomfort during past endoscopy procedures?  No    Are you taking any prescription medications for pain 3 or more times per week?   NO - No RN review required.    Do you have a history of malignant hyperthermia or adverse reaction to anesthesia?  No    (Females) Are you currently pregnant?   No     Have you been diagnosed or told you have pulmonary hypertension?   No    Do you have an LVAD?  No    Have you been told you have moderate to severe sleep apnea?  No    Have you been told you have COPD, asthma, or any other lung disease?  Yes     What breathing problems do you have?  Asthma     Do you use home oxygen?  No    Have your breathing problems required an ED visit or hospitalization in the last year?  No    Do you have any heart conditions?  No     Have you ever had an organ transplant?   No    Have you ever had or are you awaiting a heart or lung transplant?   No    Have you had a stroke or transient ischemic attack (TIA aka \"mini stroke\" in the last 6 months?   No    Have you been diagnosed with or been told you have cirrhosis of the liver?   No    Are you currently on dialysis?   No    Do you need assistance transferring?   No    BMI: Estimated body mass index is 29.99 kg/m  as calculated from " "the following:    Height as of 8/11/17: 1.664 m (5' 5.5\").    Weight as of 1/26/24: 83 kg (183 lb).     Is patients BMI > 40 and scheduling location [facility]?  No    Do you take an injectable medication for weight loss or diabetes (excluding insulin)?  No    Do you take the medication Naltrexone?  No    Do you take blood thinners?  No       Prep   Are you currently on dialysis or do you have chronic kidney disease?  No    Do you have a diagnosis of diabetes?  No    Do you have a diagnosis of cystic fibrosis (CF)?  No    On a regular basis do you go 3 -5 days between bowel movements?  No    BMI > 40?  No    Preferred Pharmacy:      [facility]  [address]  [facility] [address]  Phone: [phone] Fax: [phone]      Final Scheduling Details   Colonoscopy prep sent?  Standard MiraLAX    Procedure scheduled  Colonoscopy    Surgeon:  JANEE     Date of procedure:  3/25     Pre-OP / PAC:   No - Not required for this site.    Location  [facility] - [facility] - Patient preference.    Sedation   Moderate Sedation - Per order.      Patient Reminders:   You will receive a call from a Nurse to review instructions and health history.  This assessment must be completed prior to your procedure.  Failure to complete the Nurse assessment may result in the procedure being cancelled.      On the day of your procedure, please designate an adult(s) who can drive you home stay with you for the next 24 hours. The medicines used in the exam will make you sleepy. You will not be able to drive.      You cannot take public transportation, ride share services, or non-medical taxi service without a responsible caregiver.  Medical transport services are allowed with the requirement that a responsible caregiver will receive you at your destination.  We require that drivers and caregivers are confirmed prior to your procedure.    "

## 2024-02-19 NOTE — TELEPHONE ENCOUNTER
LMTCB regarding not having records unless sent to clinic and our provider would not be reading or responding to the results.  Faxed to number provided

## 2024-03-11 ENCOUNTER — TELEPHONE (OUTPATIENT)
Dept: GASTROENTEROLOGY | Facility: CLINIC | Age: 49
End: 2024-03-11
Payer: COMMERCIAL

## 2024-03-11 NOTE — TELEPHONE ENCOUNTER
Pre visit planning completed.      Procedure details:    Patient scheduled for Colonoscopy  on 3/25/24.     Arrival time: 1015. Procedure time 1115    Pre op exam needed? N/A    Facility location: [facility]; [address] [place] [address], [address] Floor, [place] [id number]    Sedation type: Conscious sedation     Indication for procedure:   Diverticular disease of colon            Chart review:     Electronic implanted devices? No    Recent diagnosis of diverticulitis within the last 6 weeks? No    Diabetic? No    Diabetic medication HOLDING recommendations: (if applicable)  Oral diabetic medications: No  Diabetic injectables: No  Insulin: No      Medication review:    Anticoagulants? No    NSAIDS? No    Other medication HOLDING recommendations:  N/A      Prep for procedure:     Bowel prep recommendation: Standard Miralax  Due to: standard bowel prep.    Prep instructions sent via [employer]         Yue Batista RN  Endoscopy Procedure Pre Assessment RN  [phone] option 4

## 2024-03-11 NOTE — TELEPHONE ENCOUNTER
Attempted to contact patient in order to complete pre assessment questions.     Patient scheduled for Colonoscopy  on 3/25/24.     No answer. Left message to return call to [phone] option 4    Missed call communication sent via [employer]        Yue Batista RN  Endoscopy Procedure Pre Assessment RN

## 2024-03-12 ENCOUNTER — TELEPHONE (OUTPATIENT)
Dept: OBGYN | Facility: CLINIC | Age: 49
End: 2024-03-12
Payer: COMMERCIAL

## 2024-03-12 NOTE — TELEPHONE ENCOUNTER
Pre assessment completed for upcoming procedure.   (Please see previous telephone encounter notes for complete details)    Patient  returned call.       Procedure details:    Arrival time and facility location reviewed.    Pre op exam needed? N/A    Designated  policy reviewed. Instructed to have someone stay 6  hours post procedure.       Medication review:    Medications reviewed. Please see supporting documentation below. Holding recommendations discussed (if applicable).       Prep for procedure:     Procedure prep instructions reviewed briefly. Patient feels very comfortable with them, reiterated 7 day dietary changes, timing of mag citrate, and NPO time.        Any additional information needed:  N/A      Patient  verbalized understanding and had no questions or concerns at this time.      Claudia Allen RN  Endoscopy Procedure Pre Assessment RN  [phone] option 4

## 2024-03-12 NOTE — TELEPHONE ENCOUNTER
Sent mychart with MD recommendations and to get directions on how often mercy is using inhalers.    Mercedez Suarez, Gretchen Sagastume MD  You2 minutes ago (12:37 PM)     KD  It's ok to refill a 90 day supply but please let her know that she should establish with a PCP for ongoing management of her asthma.    Thank you!    Gretchen Aguillon MD

## 2024-03-12 NOTE — TELEPHONE ENCOUNTER
Patient seen on 1/26/24 as a new patient.  Received refill requests for her  fluticasone-salmeterol (ADVAIR) 100-50 MCG/ACT inhaler  and albuterol (VENTOLIN HFA) 108 (90 Base) MCG/ACT inhaler     Has not been prescribed by us before.    Do you want to refill these or would you like us to have her establish with  FP provider for management?    Mercedez Suarez, RN

## 2024-03-25 ENCOUNTER — HOSPITAL ENCOUNTER (OUTPATIENT)
Facility: AMBULATORY SURGERY CENTER | Age: 49
Discharge: HOME | End: 2024-03-25
Attending: INTERNAL MEDICINE | Admitting: INTERNAL MEDICINE
Payer: COMMERCIAL

## 2024-03-25 VITALS
WEIGHT: 180 LBS | DIASTOLIC BLOOD PRESSURE: 65 MMHG | BODY MASS INDEX: 29.99 KG/M2 | OXYGEN SATURATION: 99 % | RESPIRATION RATE: 14 BRPM | HEART RATE: 84 BPM | HEIGHT: 65 IN | TEMPERATURE: 97.7 F | SYSTOLIC BLOOD PRESSURE: 105 MMHG

## 2024-03-25 DIAGNOSIS — K57.32 DIVERTICULITIS OF COLON: Primary | ICD-10-CM

## 2024-03-25 LAB
HCG UR QL: NEGATIVE
INTERNAL QC OK POCT: ABNORMAL
POCT KIT EXPIRATION DATE: ABNORMAL
POCT KIT LOT NUMBER: ABNORMAL

## 2024-03-25 PROCEDURE — 88305 TISSUE EXAM BY PATHOLOGIST: CPT | Mod: 26 | Performed by: PATHOLOGY

## 2024-03-25 PROCEDURE — 45385 COLONOSCOPY W/LESION REMOVAL: CPT | Performed by: INTERNAL MEDICINE

## 2024-03-25 PROCEDURE — 88305 TISSUE EXAM BY PATHOLOGIST: CPT | Mod: TC | Performed by: INTERNAL MEDICINE

## 2024-03-25 PROCEDURE — 81025 URINE PREGNANCY TEST: CPT | Performed by: PATHOLOGY

## 2024-03-25 RX ORDER — NALOXONE HYDROCHLORIDE 0.4 MG/ML
0.2 INJECTION, SOLUTION INTRAMUSCULAR; INTRAVENOUS; SUBCUTANEOUS
Status: CANCELLED | OUTPATIENT
Start: 2024-03-25

## 2024-03-25 RX ORDER — PROCHLORPERAZINE MALEATE 10 MG
10 TABLET ORAL EVERY 6 HOURS PRN
Status: CANCELLED | OUTPATIENT
Start: 2024-03-25

## 2024-03-25 RX ORDER — NALOXONE HYDROCHLORIDE 0.4 MG/ML
0.4 INJECTION, SOLUTION INTRAMUSCULAR; INTRAVENOUS; SUBCUTANEOUS
Status: CANCELLED | OUTPATIENT
Start: 2024-03-25

## 2024-03-25 RX ORDER — ONDANSETRON 2 MG/ML
4 INJECTION INTRAMUSCULAR; INTRAVENOUS
Status: DISCONTINUED | OUTPATIENT
Start: 2024-03-25 | End: 2024-03-26 | Stop reason: HOSPADM

## 2024-03-25 RX ORDER — FLUMAZENIL 0.1 MG/ML
0.2 INJECTION, SOLUTION INTRAVENOUS
Status: CANCELLED | OUTPATIENT
Start: 2024-03-25 | End: 2024-03-26

## 2024-03-25 RX ORDER — FENTANYL CITRATE 50 UG/ML
INJECTION, SOLUTION INTRAMUSCULAR; INTRAVENOUS DAILY PRN
Status: DISCONTINUED | OUTPATIENT
Start: 2024-03-25 | End: 2024-03-25 | Stop reason: HOSPADM

## 2024-03-25 RX ORDER — LIDOCAINE 40 MG/G
CREAM TOPICAL
Status: DISCONTINUED | OUTPATIENT
Start: 2024-03-25 | End: 2024-03-26 | Stop reason: HOSPADM

## 2024-03-25 RX ORDER — SODIUM CHLORIDE, SODIUM LACTATE, POTASSIUM CHLORIDE, CALCIUM CHLORIDE 600; 310; 30; 20 MG/100ML; MG/100ML; MG/100ML; MG/100ML
INJECTION, SOLUTION INTRAVENOUS CONTINUOUS
Status: DISCONTINUED | OUTPATIENT
Start: 2024-03-25 | End: 2024-03-26 | Stop reason: HOSPADM

## 2024-03-25 RX ORDER — ONDANSETRON 4 MG/1
4 TABLET, ORALLY DISINTEGRATING ORAL EVERY 6 HOURS PRN
Status: CANCELLED | OUTPATIENT
Start: 2024-03-25

## 2024-03-25 RX ORDER — ONDANSETRON 2 MG/ML
4 INJECTION INTRAMUSCULAR; INTRAVENOUS EVERY 6 HOURS PRN
Status: CANCELLED | OUTPATIENT
Start: 2024-03-25

## 2024-03-25 RX ORDER — ACETAMINOPHEN 500 MG
500-1000 TABLET ORAL EVERY 6 HOURS PRN
COMMUNITY
End: 2024-05-02

## 2024-03-25 NOTE — H&P
"Jyothi Corrales  [id number]  female  49 year old      Reason for procedure/surgery: follow up of diverticulitis; screening    Patient Active Problem List   Diagnosis    Diverticulitis of colon    Moderate persistent asthma without complication    Encounter for screening mammogram for breast cancer       Past Surgical History:    Past Surgical History:   Procedure Laterality Date    ABDOMINOPLASTY  10/20/2021    FACELIFT  2023    MAMMOPLASTY REDUCTION  2021       Past Medical History:   Past Medical History:   Diagnosis Date    Anxiety     Asthma     Depression     Pneumonia     Trauma        Social History:   Social History     Tobacco Use    Smoking status: Former     Types: Cigarettes     Quit date: 2000     Years since quittin.5     Passive exposure: Never    Smokeless tobacco: Never   Substance Use Topics    Alcohol use: Not Currently       Family History: History reviewed. No pertinent family history.    Allergies: No Known Allergies    Active Medications:   Current Outpatient Medications   Medication Sig Dispense Refill    acetaminophen (TYLENOL) 500 MG tablet Take 500-1,000 mg by mouth every 6 hours as needed for mild pain      Black Cohosh-SoyIsoflav-C Quad 40- MG CAPS       busPIRone (BUSPAR) 10 MG tablet Take 10 mg by mouth daily      cetirizine (ZYRTEC) 10 MG tablet Take 10 mg by mouth      fluticasone-salmeterol (ADVAIR) 100-50 MCG/ACT inhaler Inhale 1 puff into the lungs      albuterol (VENTOLIN HFA) 108 (90 Base) MCG/ACT inhaler          Systemic Review:   CONSTITUTIONAL: NEGATIVE for fever, chills, change in weight  ENT/MOUTH: NEGATIVE for ear, mouth and throat problems  RESP: NEGATIVE for significant cough or SOB  CV: NEGATIVE for chest pain, palpitations or peripheral edema    Physical Examination:   Vital Signs: /79 (BP Location: Right arm)   Pulse 101   Temp 97.2  F (36.2  C) (Temporal)   Resp 18   Ht 1.651 m (5' 5\")   Wt 81.6 kg (180 lb)   LMP 2024 " (Exact Date)   SpO2 95%   BMI 29.95 kg/m    GENERAL: healthy, alert and no distress  NECK: no adenopathy, no asymmetry, masses, or scars  RESP: lungs clear to auscultation - no rales, rhonchi or wheezes  CV: regular rate and rhythm, normal S1 S2, no S3 or S4, no murmur, click or rub, no peripheral edema and peripheral pulses strong  ABDOMEN: soft, nontender, no hepatosplenomegaly, no masses and bowel sounds normal  MS: no gross musculoskeletal defects noted, no edema    Plan: Appropriate to proceed as scheduled.      Myrtle Scott MD  3/25/2024    PCP:  No Ref-Primary, Physician

## 2024-03-26 LAB
PATH REPORT.COMMENTS IMP SPEC: NORMAL
PATH REPORT.COMMENTS IMP SPEC: NORMAL
PATH REPORT.FINAL DX SPEC: NORMAL
PATH REPORT.GROSS SPEC: NORMAL
PATH REPORT.MICROSCOPIC SPEC OTHER STN: NORMAL
PATH REPORT.RELEVANT HX SPEC: NORMAL
PHOTO IMAGE: NORMAL

## 2024-03-27 LAB — COLONOSCOPY: NORMAL

## 2024-03-28 NOTE — TELEPHONE ENCOUNTER
Diagnosis, Referred by & from: Diverticulitis   Appt date: 5/28/2024   NOTES STATUS DETAILS   OFFICE NOTE from referring provider N/A    OFFICE NOTE from other specialist Care Everywhere / Internal [facility]:  ([facility]) 5/14/24, 2/13/24 - GI OV with Ava Jesus NP    [facility]:  1/26/24 - OBGYN OV with Dr. Aguillon    [facility]:  11/14/23, 10/13/22 - UC OV with Dr. Ross   DISCHARGE SUMMARY from hospital N/A    DISCHARGE REPORT from the ER N/A    OPERATIVE REPORT N/A    MEDICATION LIST Internal    LABS     BIOPSIES/PATHOLOGY RELATED TO DIAGNOSIS Internal [facility]:  3/25/24 - Colon Biopsy (Case: [id number])   DIAGNOSTIC PROCEDURES     COLONOSCOPY (most recent all time after 5 years) Internal [facility]:  3/25/24 - Colonoscopy   IMAGING (DISC & REPORT)      CT Received [facility]:  10/13/22 - CT Abd/Pelvis     Records Requested  03/28/24    Facility  [facility]  Fax: [phone]   Outcome * 3/28/24 7:50 AM Faxed request to [facility] for images to be pushed to [facility]. - Radha    * 4/11/24 11:59 AM Images received from [facility] and attached to the patient in PACs. - Radha

## 2024-05-02 ENCOUNTER — OFFICE VISIT (OUTPATIENT)
Dept: OBGYN | Facility: CLINIC | Age: 49
End: 2024-05-02
Payer: COMMERCIAL

## 2024-05-02 ENCOUNTER — TELEPHONE (OUTPATIENT)
Dept: OBGYN | Facility: CLINIC | Age: 49
End: 2024-05-02

## 2024-05-02 VITALS
SYSTOLIC BLOOD PRESSURE: 116 MMHG | HEART RATE: 83 BPM | BODY MASS INDEX: 30.29 KG/M2 | WEIGHT: 182 LBS | RESPIRATION RATE: 16 BRPM | DIASTOLIC BLOOD PRESSURE: 79 MMHG | TEMPERATURE: 97.4 F

## 2024-05-02 DIAGNOSIS — N95.8 GENITOURINARY SYNDROME OF MENOPAUSE: Primary | ICD-10-CM

## 2024-05-02 DIAGNOSIS — N95.1 VASOMOTOR SYMPTOMS DUE TO MENOPAUSE: ICD-10-CM

## 2024-05-02 DIAGNOSIS — N39.3 SUI (STRESS URINARY INCONTINENCE, FEMALE): ICD-10-CM

## 2024-05-02 PROCEDURE — 99214 OFFICE O/P EST MOD 30 MIN: CPT | Performed by: OBSTETRICS & GYNECOLOGY

## 2024-05-02 RX ORDER — ESTRADIOL 0.1 MG/G
CREAM VAGINAL
Qty: 42.5 G | Refills: 6 | Status: SHIPPED | OUTPATIENT
Start: 2024-05-03 | End: 2024-10-30

## 2024-05-02 NOTE — PROGRESS NOTES
GYN Progress Note     CC: Follow up for perimenopause symptoms     HPI: Jyothi Corrales is a 49 year old  who presents to clinic for follow up due to a history of perimenopause symptoms. She was last seen in the office for her annual exam in 2024 and at that time was managing her hot flashes with lifestyle changes but these has gotten significantly worse over the last few months. She is also having disrupted sleep as well as some vaginal dryness and worsening stress incontinence. She is still getting a period every month with a shorter cycle length, typically every 22 days or so.     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   Plan: estradiol (ESTRACE) 0.1 MG/GM vaginal cream            (N95.1) Vasomotor symptoms due to menopause  -We discussed the risks, benefits and alternatives of Hormone Replacement Therapy (HRT) with Estrogen and Progesterone. The primary benefit is treatment of hot flashes when they interfere with quality of life. There is also a small benefit in prevention of osteoporosis and colorectal cancers. There is a small increase in the rates of breast cancer, heart disease, stroke and blood clot, but the absolute risk remains low. She is low risk for CV  complications and denies any personal or family history of blood clots.  It is advised that women use this treatment at the lowest dose and for the shortest duration as possible.   Plan: estradiol-norethindrone (COMBIPATCH) 0.05-0.14         MG/DAY bi-weekly patch            (N39.3) PRAEDEP (stress urinary incontinence, female)  -Patient was given information on vaginal estrogen, kegel exercises as well as the options to use Impressa vaginal inserts for incontinence with exercise. We reviewed that if her symptoms don't improve, we can either refer her to pelvic floor PT or to urogynecology to discuss surgical management.     Dispo: RTC as needed, patient will send update via MMIS in 3-6 months regarding results from HRT and vaginal estrogen     Gretchen Aguillon MD

## 2024-05-02 NOTE — TELEPHONE ENCOUNTER
[facility] Call Center    Phone Message    May a detailed message be left on voicemail: yes     Reason for Call: Other: Medication clarification request on pt Estradiol. Please contact [facility] pharmacy and ask for Ayo.  [phone] vm allowed -- direct number for Pharmacist.     Action Taken: Other: ALLEN MENDES    Travel Screening: Not Applicable

## 2024-05-06 ENCOUNTER — TELEPHONE (OUTPATIENT)
Dept: OBGYN | Facility: CLINIC | Age: 49
End: 2024-05-06

## 2024-05-06 NOTE — Clinical Note
This came to your out of office pool.  It looks like you could do climara pro but that's weekly. Or santos toney with prometrium 100 mg or Mirena IUD.  FYI - ACOG/the Menopause Society aren't saying lowest dose for shortest time anymore. Mostly recommendation is to start within 10 years of menopause and before age 60 but there isn't a recommendation to stop necessarily.

## 2024-05-07 DIAGNOSIS — N95.1 VASOMOTOR SYMPTOMS DUE TO MENOPAUSE: Primary | ICD-10-CM

## 2024-05-07 NOTE — TELEPHONE ENCOUNTER
PRIOR AUTHORIZATION DENIED    Medication: ESTRADIOL-NORETHINDRONE ACET 0.05-0.14 MG/DAY TD PTTW  Insurance Company: [employer] - Phone [phone] Fax [phone]  Denial Date: 5/5/2024  Denial Reason(s):     Appeal Information:     Patient Notified: No

## 2024-05-28 ENCOUNTER — PRE VISIT (OUTPATIENT)
Dept: SURGERY | Facility: CLINIC | Age: 49
End: 2024-05-28

## 2024-06-12 ENCOUNTER — MYC MEDICAL ADVICE (OUTPATIENT)
Dept: OBGYN | Facility: CLINIC | Age: 49
End: 2024-06-12
Payer: COMMERCIAL

## 2024-06-12 DIAGNOSIS — N95.1 VASOMOTOR SYMPTOMS DUE TO MENOPAUSE: Primary | ICD-10-CM

## 2024-06-13 RX ORDER — LEVONORGESTREL/ETHIN.ESTRADIOL 0.1-0.02MG
1 TABLET ORAL DAILY
Qty: 30 TABLET | Refills: 6 | Status: CANCELLED | OUTPATIENT
Start: 2024-06-13

## 2024-06-13 RX ORDER — NORETHINDRONE ACETATE AND ETHINYL ESTRADIOL .02; 1 MG/1; MG/1
1 TABLET ORAL DAILY
Qty: 84 TABLET | Refills: 4 | Status: SHIPPED | OUTPATIENT
Start: 2024-06-13 | End: 2025-07-13

## 2024-06-13 NOTE — TELEPHONE ENCOUNTER
Started Estradiol-levonorgestrel patch on 5/7/24 and it is causing patient irritation, wondering if she can switch to oral pill?     RN unsure of which dosage is appropriate for oral pill, please review pended rx.    JOHN Woods

## 2025-02-11 NOTE — PROGRESS NOTES
"Jyothi Corrales is a 50 year old patient who is being evaluated via a billable virtual visit.       How would you like to obtain your AVS? {AVS Preference:028949}  If the video visit is dropped, the invitation should be resent by: {video visit invitation (Optional) :660464}  Will anyone else be joining your video visit? {:[id number]}    Video-Visit Details     Type of service:  Video Visit     Video Start Time (time video started): ***     Video End Time (time video stopped): ***     Physician has received verbal consent for a Video Visit from the patient? {YES-NO  Default Yes:4444}     Originating Location (pt. Location): {video visit patient location:936926::\"Home\"}    Distant Location (provider location):  {virtual location provider:583002}    Platform used for Video Visit: {Virtual Visit Platforms:158412::\"[employer]\"}  "

## 2025-02-19 ENCOUNTER — VIRTUAL VISIT (OUTPATIENT)
Dept: GASTROENTEROLOGY | Facility: CLINIC | Age: 50
End: 2025-02-19
Payer: COMMERCIAL

## 2025-03-22 ENCOUNTER — HEALTH MAINTENANCE LETTER (OUTPATIENT)
Age: 50
End: 2025-03-22

## (undated) DEVICE — SOL WATER IRRIG 500ML BOTTLE 2F7113

## (undated) DEVICE — SPECIMEN CONTAINER 3OZ W/FORMALIN 59901

## (undated) DEVICE — SUCTION MANIFOLD NEPTUNE 2 SYS 1 PORT 702-025-000

## (undated) DEVICE — ENDO FORCEP BX CAPTURA PRO SPIKE G50696

## (undated) DEVICE — KIT ENDO TURNOVER/PROCEDURE CARRY-ON 101822

## (undated) DEVICE — TUBING SUCTION MEDI-VAC 1/4"X20' N620A

## (undated) DEVICE — GOWN IMPERVIOUS 2XL BLUE

## (undated) RX ORDER — FENTANYL CITRATE 50 UG/ML
INJECTION, SOLUTION INTRAMUSCULAR; INTRAVENOUS
Status: DISPENSED
Start: 2024-03-25